# Patient Record
Sex: MALE | URBAN - NONMETROPOLITAN AREA
[De-identification: names, ages, dates, MRNs, and addresses within clinical notes are randomized per-mention and may not be internally consistent; named-entity substitution may affect disease eponyms.]

---

## 2021-11-22 ENCOUNTER — OFFICE VISIT (OUTPATIENT)
Dept: URBAN - NONMETROPOLITAN AREA CLINIC 14 | Facility: CLINIC | Age: 51
End: 2021-11-22
Payer: COMMERCIAL

## 2021-11-22 DIAGNOSIS — H17.11 CENTRAL CORNEAL OPACITY OF RIGHT EYE: Primary | ICD-10-CM

## 2021-11-22 PROCEDURE — 99204 OFFICE O/P NEW MOD 45 MIN: CPT | Performed by: OPTOMETRIST

## 2021-11-22 ASSESSMENT — VISUAL ACUITY
OD: 20/20
OS: 20/20

## 2021-11-22 ASSESSMENT — INTRAOCULAR PRESSURE
OS: 13
OD: 11

## 2021-11-22 NOTE — IMPRESSION/PLAN
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 Plan: Stable. Will continue to monitor for now.